# Patient Record
Sex: FEMALE | Race: WHITE
[De-identification: names, ages, dates, MRNs, and addresses within clinical notes are randomized per-mention and may not be internally consistent; named-entity substitution may affect disease eponyms.]

---

## 2017-12-09 ENCOUNTER — HOSPITAL ENCOUNTER (EMERGENCY)
Dept: HOSPITAL 62 - ER | Age: 22
Discharge: HOME | End: 2017-12-09
Payer: COMMERCIAL

## 2017-12-09 VITALS — DIASTOLIC BLOOD PRESSURE: 90 MMHG | SYSTOLIC BLOOD PRESSURE: 136 MMHG

## 2017-12-09 DIAGNOSIS — M79.631: ICD-10-CM
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
## 2017-12-09 DIAGNOSIS — S40.021A: Primary | ICD-10-CM
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
## 2017-12-09 PROCEDURE — 99281 EMR DPT VST MAYX REQ PHY/QHP: CPT

## 2017-12-09 NOTE — ER DOCUMENT REPORT
ED Extremity Problem, Upper





- General


Chief Complaint: Arm Injury


Stated Complaint: RIGHT ARM PAIN


Time Seen by Provider: 12/09/17 13:40


Mode of Arrival: Ambulatory


Information source: Patient


Notes: 





32-year-old female presents to ED for complaint of right forearm pain on 

Thursday when she was lifting packages and the bag fell over and landed on her 

forearm.  She states the back weighed between 20 and 30 pounds.  She states at 

that time she had bruising and pain but that she does not have pain at this 

time.  She has full range of motion for her arm and wrist.  Patient states that 

she went to take a note to go back to work and they said she needed to go to 1 

of the doctors for her Worker's Comp.  So she came to the emergency room.  

There is no need for an x-ray as she was x-rayed at [facility] and she does 

not have any pain at this time.


TRAVEL OUTSIDE OF THE [place] IN LAST 30 DAYS: No





- HPI


Patient complains to provider of: Right, Forearm


Onset: Other - Thursday


Recent injury: Yes


Where: Work


Quality of pain: Pressure, Sharp


Severity of pain: Gone now


Pain Level: Denies


Context: Other - Luggage fell on her right forearm at work


Associated symptoms: None


Exacerbated by: Nothing


Relieved by: Nothing


Similar symptoms previously: Yes


Recently seen / treated by doctor: Yes





- Related Data


Allergies/Adverse Reactions: 


 





No Known Allergies Allergy (Unverified 12/09/17 13:25)


 











Past Medical History





- General


Information source: Patient





- Social History


Smoking Status: Never Smoker


Cigarette use (# per day): No


Chew tobacco use (# tins/day): No


Smoking Education Provided: No


Frequency of alcohol use: Occasional


Drug Abuse: None


Occupation: [employer]


Lives with: Family


Family History: Arthritis, CAD, COPD, DM, Hyperlipidemia, Hypertension, 

Malignancy, Thyroid Disfunction


Patient has suicidal ideation: No


Patient has homicidal ideation: No





- Past Medical History


Cardiac Medical History: Reports: None


Pulmonary Medical History: Reports: None


EENT Medical History: Reports: None


Neurological Medical History: Reports: None


Endocrine Medical History: Reports: None


Renal/ Medical History: Reports: None


Malignancy Medical History: Reports: None


GI Medical History: Reports: None


Musculoskeltal Medical History: Reports None


Skin Medical History: Reports None


Psychiatric Medical History: Reports: None


Traumatic Medical History: Reports: None


Infectious Medical History: Reports: None


Past Surgical History: Reports: Hx Adenoidectomy, Hx Oral Surgery - Jaw surgery

, Hx Tonsillectomy





- Immunizations


Immunizations up to date: Yes





Review of Systems





- Review of Systems


Constitutional: No symptoms reported


EENT: No symptoms reported


Cardiovascular: No symptoms reported


Respiratory: No symptoms reported


Gastrointestinal: No symptoms reported


Genitourinary: No symptoms reported


Female Genitourinary: No symptoms reported


Musculoskeletal: No symptoms reported


Skin: No symptoms reported


Hematologic/Lymphatic: No symptoms reported


Neurological/Psychological: No symptoms reported


-: Yes All other systems reviewed and negative





Physical Exam





- Vital signs


Vitals: 





 











Temp Pulse Resp BP Pulse Ox


 


 97.5 F   83   20   136/90 H  100 


 


 12/09/17 13:30  12/09/17 13:30  12/09/17 13:30  12/09/17 13:30  12/09/17 13:30











Interpretation: Normal





- General


General appearance: Appears well, Alert





- HEENT


Head: Normocephalic, Atraumatic


Eyes: Normal


Pupils: PERRL





- Respiratory


Respiratory status: No respiratory distress


Chest status: Nontender


Breath sounds: Normal


Chest palpation: Normal





- Cardiovascular


Rhythm: Regular


Heart sounds: Normal auscultation


Murmur: No





- Abdominal


Inspection: Normal


Distension: No distension


Bowel sounds: Normal


Tenderness: Nontender


Organomegaly: No organomegaly





- Back


Back: Normal, Nontender





- Extremities


General upper extremity: Normal inspection, Nontender, Normal color, Normal ROM

, Normal temperature


General lower extremity: Normal inspection, Nontender, Normal color, Normal ROM

, Normal temperature, Normal weight bearing.  No: Van's sign


Forearm: No: Tender, Abrasion, Deformity, Ecchymosis, Instability, Laceration, 

Other





- Neurological


Neuro grossly intact: Yes


Cognition: Normal


Orientation: AAOx4


[place] Coma Scale Eye Opening: Spontaneous


[place] Coma Scale Verbal: Oriented


[place] Coma Scale Motor: Obeys Commands


[place] Coma Scale Total: 15


Speech: Normal


Motor strength normal: LUE, RUE, LLE, RLE


Sensory: Normal





- Psychological


Associated symptoms: Normal affect, Normal mood





- Skin


Skin Temperature: Warm


Skin Moisture: Dry


Skin Color: Normal





Course





- Re-evaluation


Re-evalutation: 





12/09/17 14:25


No tenderness no bruising range of motion steady unlabored.  Will discharge 

home.





- Vital Signs


Vital signs: 





 











Temp Pulse Resp BP Pulse Ox


 


 97.5 F   83   20   136/90 H  100 


 


 12/09/17 13:30  12/09/17 13:30  12/09/17 13:30  12/09/17 13:30  12/09/17 13:30














Discharge





- Discharge


Clinical Impression: 


Contusion of right upper arm


Qualifiers:


 Encounter type: initial encounter Qualified Code(s): S40.021A - Contusion of 

right upper arm, initial encounter





Condition: Stable


Disposition: HOME, SELF-CARE


Instructions:  Family Physicians / Practices


Additional Instructions: 


CONTUSION:


    Your injury has resulted in a contusion -- a crushing of the deep tissues.  

No injury to important structures was detected during the physician's exam.  

Contusions vary in the amount of pain they cause, and in the length of time 

required for healing.  Typically, the area will become bruised, and will remain 

painful to touch for two or three weeks.  However, most patients are back to 

working and playing within a few days.


     After the initial period of rest and cold-packs, your symptoms (together 

with the doctor's recommendations) will determine how rapidly you can get back 

to full activity.  Usually this means "do what feels okay, but don't do things 

that hurt."


     If re-examination was recommended, it's important to follow up as 

instructed.  Call the doctor or return any time if pain increases, if swelling 

becomes severe, if you develop numbness or weakness in an injured extremity, or 

if any other alarming symptoms occur.





USE OF TYLENOL (ACETAMINOPHEN):


     Acetaminophen may be taken for pain relief or fever control. It's much 

safer than aspirin, offering a wider range of "safe" dosages.  It is safe 

during pregnancy.  Some brand names are Tylenol, Panadol, Datril, Anacin 3, 

Tempra, and Liquiprin. Acetaminophen can be repeated every four hours.  The 

following are maximum recommended dosages:





WEIGHT         Dose             Drops                  Elixir        Chewable(

80mg)


(LBS.)                            drprs=droppers    tsp=teaspoon


6               40 mg            0.4 ml (1/2)


6-11            80 mg            0.8 ml (full)              tsp               

   1       tab


12-16         120 mg           1 1/2 drprs             3/4  tsp               1 

1/2  tabs


17-23         160 mg             2  drprs             1    tsp                 

  2       tabs


24-30         240 mg             3  drprs             1 1/2 tsp                

3       tabs


30-35         320 mg                                       2    tsp            

       4       tabs


36-41         360 mg                                       2 1/4   tsp         

     4 1/2 tabs


42-47         400 mg                                       2 1/2   tsp         

     5      tabs


48-53         480 mg                                       3    tsp            

       6      tabs


54-59         520 mg                                       3  1/4  tsp         

     6 1/2 tabs


60-64         560 mg                                       3  1/2  tsp         

     7      tabs 


65-70         600 mg                                       3  3/4  tsp         

     7 1/2 tabs


71-76         640 mg                                       4   tsp             

      8      tabs


77-82         720 mg                                       4 1/2   tsp         

    9      tabs


83-88         800 mg                                       5   tsp             

    10      tabs





>89 pounds or adults          650 mg to 900 mg





Acetaminophen can be repeated every four hours.  Maximum dose not to exceed 

4000 mg a day.





   These maximum recommended dosages are slightly higher than the dosages 

written on the product container, but these dosages are very safe and below the 

toxic dosage for acetaminophen.











ICE & ELEVATION:


     Apply ice packs frequently against the painful area.  Many different 

schedules are recommended, such as "20 minutes on, 20 minutes off" or "one hour 

ice, two hours rest."  If you need to work, you may need to go longer between 

ice treatments.  You should plan to have the area ice packed AT LEAST one-

fourth of the time.


     The ice should be applied over the wrap, tape, or splint, or over a layer 

of cloth -- not directly against the skin.  Some ice bags have a built-in cloth 

and can be put directly on the skin.


     Your injured part should be elevated as much as possible over the next 48 

hours.  Try to keep the injury above the level of the heart. Avoid use of the 

injured area.  Elevation and rest will decrease the swelling.








USE OF OVER-THE-COUNTER IBUPROFEN:


     Ibuprofen (Advil, Nuprin, Medipren, Motrin IB) is a medication for fever 

and pain control.  In addition, it has anti- inflammatory effects which may be 

beneficial, especially in the treatment of injuries.


     It's best to take ibuprofen with food.  Persons with ulcer disease or 

allergy to aspirin should notify their physician of this before taking 

ibuprofen.


     Ibuprofen can be given every four to six hours, for a total of four doses 

daily.


     Age              Pain or fever dose          Antiinflammatory dose


     6-8 yr              200 mg (1 tab)                200 mg (1 tab)


     9-11 yr             200 mg (1 tab)                200-400 mg (1-2 tab)


     11-14 yr            200-400 mg (1-2 tab)         400 mg (2 tab)


     15-adult            400 mg (2 tab)                600 mg (3 tab)





FOLLOW-UP CARE:


If you have been referred to a physician for follow-up care, call the physician

s office for an appointment as you were instructed or within the next two days.

  If you experience worsening or a significant change in your symptoms, notify 

the physician immediately or return to the Emergency Department at any time for 

re-evaluation.


Forms:  Elevated Blood Pressure, Return to Work

## 2018-06-15 ENCOUNTER — HOSPITAL ENCOUNTER (EMERGENCY)
Dept: HOSPITAL 62 - ER | Age: 23
Discharge: HOME | End: 2018-06-15
Payer: COMMERCIAL

## 2018-06-15 VITALS — DIASTOLIC BLOOD PRESSURE: 78 MMHG | SYSTOLIC BLOOD PRESSURE: 102 MMHG

## 2018-06-15 DIAGNOSIS — R32: ICD-10-CM

## 2018-06-15 DIAGNOSIS — R11.0: ICD-10-CM

## 2018-06-15 DIAGNOSIS — R10.9: ICD-10-CM

## 2018-06-15 DIAGNOSIS — N30.00: ICD-10-CM

## 2018-06-15 DIAGNOSIS — C76.2: Primary | ICD-10-CM

## 2018-06-15 DIAGNOSIS — R39.198: ICD-10-CM

## 2018-06-15 DIAGNOSIS — E86.0: ICD-10-CM

## 2018-06-15 DIAGNOSIS — R35.0: ICD-10-CM

## 2018-06-15 LAB
ADD MANUAL DIFF: NO
ALBUMIN SERPL-MCNC: 5 G/DL (ref 3.5–5)
ALP SERPL-CCNC: 48 U/L (ref 38–126)
ALT SERPL-CCNC: 31 U/L (ref 9–52)
ANION GAP SERPL CALC-SCNC: 15 MMOL/L (ref 5–19)
APPEARANCE UR: (no result)
APTT PPP: YELLOW S
AST SERPL-CCNC: 22 U/L (ref 14–36)
BASOPHILS # BLD AUTO: 0.1 10^3/UL (ref 0–0.2)
BASOPHILS NFR BLD AUTO: 0.7 % (ref 0–2)
BILIRUB DIRECT SERPL-MCNC: 0.4 MG/DL (ref 0–0.4)
BILIRUB SERPL-MCNC: 1.2 MG/DL (ref 0.2–1.3)
BILIRUB UR QL STRIP: NEGATIVE
BUN SERPL-MCNC: 19 MG/DL (ref 7–20)
CALCIUM: 10.5 MG/DL (ref 8.4–10.2)
CHLORIDE SERPL-SCNC: 101 MMOL/L (ref 98–107)
CO2 SERPL-SCNC: 29 MMOL/L (ref 22–30)
EOSINOPHIL # BLD AUTO: 0.2 10^3/UL (ref 0–0.6)
EOSINOPHIL NFR BLD AUTO: 1.9 % (ref 0–6)
ERYTHROCYTE [DISTWIDTH] IN BLOOD BY AUTOMATED COUNT: 15 % (ref 11.5–14)
GLUCOSE SERPL-MCNC: 84 MG/DL (ref 75–110)
GLUCOSE UR STRIP-MCNC: NEGATIVE MG/DL
HCT VFR BLD CALC: 43.5 % (ref 36–47)
HGB BLD-MCNC: 14.6 G/DL (ref 12–15.5)
KETONES UR STRIP-MCNC: (no result) MG/DL
LYMPHOCYTES # BLD AUTO: 3 10^3/UL (ref 0.5–4.7)
LYMPHOCYTES NFR BLD AUTO: 31.7 % (ref 13–45)
MCH RBC QN AUTO: 27.7 PG (ref 27–33.4)
MCHC RBC AUTO-ENTMCNC: 33.6 G/DL (ref 32–36)
MCV RBC AUTO: 83 FL (ref 80–97)
MONOCYTES # BLD AUTO: 0.5 10^3/UL (ref 0.1–1.4)
MONOCYTES NFR BLD AUTO: 5.1 % (ref 3–13)
NEUTROPHILS # BLD AUTO: 5.7 10^3/UL (ref 1.7–8.2)
NEUTS SEG NFR BLD AUTO: 60.6 % (ref 42–78)
NITRITE UR QL STRIP: NEGATIVE
PH UR STRIP: 5 [PH] (ref 5–9)
PLATELET # BLD: 197 10^3/UL (ref 150–450)
POTASSIUM SERPL-SCNC: 4.5 MMOL/L (ref 3.6–5)
PROT SERPL-MCNC: 8.4 G/DL (ref 6.3–8.2)
PROT UR STRIP-MCNC: NEGATIVE MG/DL
RBC # BLD AUTO: 5.28 10^6/UL (ref 3.72–5.28)
SODIUM SERPL-SCNC: 144.8 MMOL/L (ref 137–145)
SP GR UR STRIP: 1.03
TOTAL CELLS COUNTED % (AUTO): 100 %
UROBILINOGEN UR-MCNC: 2 MG/DL (ref ?–2)
WBC # BLD AUTO: 9.5 10^3/UL (ref 4–10.5)

## 2018-06-15 PROCEDURE — 99283 EMERGENCY DEPT VISIT LOW MDM: CPT

## 2018-06-15 PROCEDURE — 87086 URINE CULTURE/COLONY COUNT: CPT

## 2018-06-15 PROCEDURE — 80053 COMPREHEN METABOLIC PANEL: CPT

## 2018-06-15 PROCEDURE — 81001 URINALYSIS AUTO W/SCOPE: CPT

## 2018-06-15 PROCEDURE — 96372 THER/PROPH/DIAG INJ SC/IM: CPT

## 2018-06-15 PROCEDURE — 36415 COLL VENOUS BLD VENIPUNCTURE: CPT

## 2018-06-15 PROCEDURE — 85025 COMPLETE CBC W/AUTO DIFF WBC: CPT

## 2018-06-15 NOTE — ER DOCUMENT REPORT
ED GI/





- General


Chief Complaint: Urinary Problem


Stated Complaint: ABDOMINAL PAIN


Time Seen by Provider: 06/15/18 13:40


Mode of Arrival: Ambulatory


Information source: Patient


TRAVEL OUTSIDE OF THE [place] IN LAST 30 DAYS: No





- HPI


Patient complains to provider of: Abdominal pain, Other - URINARY INCONTINENCE


Onset: This morning


Timing/Duration: Sudden


Quality of pain: Burning, Cramping, Sharp


Severity at maximum: Moderate


Severity in ED: Moderate


Context: Other - KNOWN INTRA-ABDOMINAL MALIGNANCY, ON CHEMOTx.  denies: Bad food

, Lifting, Out of the country travel, Pregnant, Recent trauma


Vaginal bleeding (Compared to normal period): None


Menstrual period history: Abnormal - AMENORRHEIC W/ CHEMOTx.  denies: Pregnant


Associated symptoms: Nausea, Urinary frequency.  denies: Chills, Diarrhea, Fever

, Hematuria


Exacerbated by: Denies


Relieved by: Denies


Similar symptoms previously: No


Recently seen / treated by doctor: Yes - ROUTINE F/U W/ ONCOLOGY





- Related Data


Allergies/Adverse Reactions: 


 





No Known Allergies Allergy (Verified 06/15/18 13:11)


 











Past Medical History





- General


Information source: Patient





- Social History


Smoking Status: Never Smoker


Cigarette use (# per day): No


Chew tobacco use (# tins/day): No


Frequency of alcohol use: None


Drug Abuse: None


Lives with: Spouse/Significant other


Family History: Arthritis, CAD, COPD, DM, Hyperlipidemia, Hypertension, 

Malignancy, Thyroid Disfunction


Patient has suicidal ideation: No


Patient has homicidal ideation: No





- Past Medical History


Cardiac Medical History: Reports: None


Pulmonary Medical History: Reports: None


EENT Medical History: Reports: None


Neurological Medical History: Reports: None


Endocrine Medical History: Reports: None


Renal/ Medical History: Reports: None.  Denies: Hx Peritoneal Dialysis


Malignancy Medical History: Reports: Other - SEE HPI


GI Medical History: Reports: None


Musculoskeltal Medical History: Reports None


Psychiatric Medical History: Reports: None


Past Surgical History: Reports: Hx Adenoidectomy, Hx Oral Surgery - Jaw surgery

, Hx Tonsillectomy





- Immunizations


Immunizations up to date: Yes





Review of Systems





- Review of Systems


Constitutional: No symptoms reported


EENT: No symptoms reported


Cardiovascular: No symptoms reported


Respiratory: No symptoms reported


Gastrointestinal: See HPI


Genitourinary: See HPI


Female Genitourinary: See HPI


Musculoskeletal: No symptoms reported


Skin: No symptoms reported


Neurological/Psychological: No symptoms reported





Physical Exam





- Vital signs


Vitals: 


 











Temp Pulse Resp BP Pulse Ox


 


 98.7 F   128 H  20   121/85   100 


 


 06/15/18 13:19  06/15/18 13:19  06/15/18 13:19  06/15/18 13:19  06/15/18 13:19











Interpretation: Tachycardic - SAYS SHE'S MILDLY TACHYCARDIC @ BASELINE.  No: 

Hypotensive, Tachypneic, Febrile





- General


General appearance: Appears well, Alert


In distress: None





- HEENT


Head: Normocephalic


Eyes: Normal.  No: Pale conjunctiva


Conjunctiva: Normal


Ears: Normal


Nasal: Normal


Mouth/Lips: Normal


Mucous membranes: Dry - MILDLY


Pharynx: Normal


Neck: Normal





- Respiratory


Respiratory status: No respiratory distress


Breath sounds: Normal





- Cardiovascular


Rhythm: Regular


Heart sounds: Normal auscultation


Murmur: No





- Abdominal


Inspection: Normal


Distension: No distension


Bowel sounds: Hypoactive


Tenderness: Tender - MILD, SUPRAPUBIC





- Back


Back: Normal.  No: CVA tenderness





- Extremities


General upper extremity: Normal inspection


General lower extremity: Normal inspection





- Neurological


Neuro grossly intact: Yes


Cognition: Normal


Orientation: AAOx4





- Psychological


Associated symptoms: Normal affect, Normal mood





- Skin


Skin Temperature: Warm


Skin Moisture: Dry


Skin Color: Normal


Skin Turgor: Elastic





Course





- Vital Signs


Vital signs: 


 











Temp Pulse Resp BP Pulse Ox


 


 98.7 F   128 H  21 H  113/79   100 


 


 06/15/18 13:19  06/15/18 13:19  06/15/18 16:01  06/15/18 16:00  06/15/18 16:01














- Laboratory


Result Diagrams: 


 06/15/18 15:20





 06/15/18 15:20


Laboratory results interpreted by me: 


 











  06/15/18 06/15/18 06/15/18





  15:20 15:20 15:20


 


RDW  15.0 H  


 


Calcium   10.5 H 


 


Total Protein   8.4 H 


 


Urine Ketones    TRACE H


 


Urine Urobilinogen    2.0 H














- Consults


  ** DR. HUIZAR


Time consulted: 17:43


Reason for consultation: 





06/15/18 17:53


Discussed patient's presentation, physical findings, and laboratory results 

with Dr. Huizar, who accessed patient's oncology records.  She advised that 

imaging studies are not indicated at the present time, as patient had a 

negative PET scan 6 weeks ago.  Also, she feels it is reasonable to treat the 

urinary tract infection with oral antibiotics and urinary analgesics.  She or 

someone from her office will follow up with patient by phone on Monday.  Plan 

discussed with patient and spouse, and they are pleased.


Consulted provider: follow-up in office





Discharge





- Discharge


Clinical Impression: 


 Dehydration, Intra-abdominal malignant neoplasm





Urinary tract infection


Qualifiers:


 Urinary tract infection type: acute cystitis Hematuria presence: without 

hematuria Qualified Code(s): N30.00 - Acute cystitis without hematuria





Condition: Stable


Disposition: HOME, SELF-CARE


Instructions:  Ciprofloxacin ([facility]), Rocephin ([facility]), Urinary Anesthetic Agent (

[facility]), Urinary Tract Infection ([facility])


Additional Instructions: 


REST, DRINK PLENTY OF FLUIDS.


TAKE CIPRO AND PYRIDIUM AS DIRECTED, BEGIN THIS EVENING.


CONTINUE ALL OF YOUR USUAL MEDS.


FOLLOW UP WITH YOUR ONCOLOGIST AS SCHEDULED (SOMEONE FROM THEIR OFFICE WILL 

CALL MONDAY TO CHECK ON YOU).


RETURN TO E.R. IF PROBLEMS, ANY TIME.


Prescriptions: 


Ciprofloxacin HCl [Cipro 500 mg Tablet] 500 mg PO BID #20 tablet


Phenazopyridine HCl [Pyridium 200 mg Tablet] 200 mg PO TID #15 tablet

## 2019-08-29 ENCOUNTER — HOSPITAL ENCOUNTER (EMERGENCY)
Dept: HOSPITAL 62 - ER | Age: 24
LOS: 1 days | Discharge: HOME | End: 2019-08-30
Payer: COMMERCIAL

## 2019-08-29 DIAGNOSIS — R50.9: ICD-10-CM

## 2019-08-29 DIAGNOSIS — N30.00: Primary | ICD-10-CM

## 2019-08-29 DIAGNOSIS — R10.31: ICD-10-CM

## 2019-08-29 DIAGNOSIS — Z79.899: ICD-10-CM

## 2019-08-29 PROCEDURE — 83690 ASSAY OF LIPASE: CPT

## 2019-08-29 PROCEDURE — 80053 COMPREHEN METABOLIC PANEL: CPT

## 2019-08-29 PROCEDURE — 96361 HYDRATE IV INFUSION ADD-ON: CPT

## 2019-08-29 PROCEDURE — 85025 COMPLETE CBC W/AUTO DIFF WBC: CPT

## 2019-08-29 PROCEDURE — 81001 URINALYSIS AUTO W/SCOPE: CPT

## 2019-08-29 PROCEDURE — 87088 URINE BACTERIA CULTURE: CPT

## 2019-08-29 PROCEDURE — 36591 DRAW BLOOD OFF VENOUS DEVICE: CPT

## 2019-08-29 PROCEDURE — 96365 THER/PROPH/DIAG IV INF INIT: CPT

## 2019-08-29 PROCEDURE — 87040 BLOOD CULTURE FOR BACTERIA: CPT

## 2019-08-29 PROCEDURE — 96375 TX/PRO/DX INJ NEW DRUG ADDON: CPT

## 2019-08-29 PROCEDURE — 74177 CT ABD & PELVIS W/CONTRAST: CPT

## 2019-08-29 PROCEDURE — 87086 URINE CULTURE/COLONY COUNT: CPT

## 2019-08-29 PROCEDURE — 99284 EMERGENCY DEPT VISIT MOD MDM: CPT

## 2019-08-29 PROCEDURE — 36415 COLL VENOUS BLD VENIPUNCTURE: CPT

## 2019-08-29 PROCEDURE — 81025 URINE PREGNANCY TEST: CPT

## 2019-08-30 VITALS — SYSTOLIC BLOOD PRESSURE: 106 MMHG | DIASTOLIC BLOOD PRESSURE: 55 MMHG

## 2019-08-30 LAB
ADD MANUAL DIFF: NO
ALBUMIN SERPL-MCNC: 4.3 G/DL (ref 3.5–5)
ALP SERPL-CCNC: 75 U/L (ref 38–126)
ANION GAP SERPL CALC-SCNC: 12 MMOL/L (ref 5–19)
APPEARANCE UR: CLEAR
APTT PPP: (no result) S
AST SERPL-CCNC: 38 U/L (ref 14–36)
BASOPHILS # BLD AUTO: 0.1 10^3/UL (ref 0–0.2)
BASOPHILS NFR BLD AUTO: 0.6 % (ref 0–2)
BILIRUB DIRECT SERPL-MCNC: 0.4 MG/DL (ref 0–0.4)
BILIRUB SERPL-MCNC: 1.3 MG/DL (ref 0.2–1.3)
BILIRUB UR QL STRIP: NEGATIVE
BUN SERPL-MCNC: 9 MG/DL (ref 7–20)
CALCIUM: 9.1 MG/DL (ref 8.4–10.2)
CHLORIDE SERPL-SCNC: 94 MMOL/L (ref 98–107)
CO2 SERPL-SCNC: 26 MMOL/L (ref 22–30)
EOSINOPHIL # BLD AUTO: 0.1 10^3/UL (ref 0–0.6)
EOSINOPHIL NFR BLD AUTO: 1.2 % (ref 0–6)
ERYTHROCYTE [DISTWIDTH] IN BLOOD BY AUTOMATED COUNT: 13.3 % (ref 11.5–14)
GLUCOSE SERPL-MCNC: 65 MG/DL (ref 75–110)
GLUCOSE UR STRIP-MCNC: NEGATIVE MG/DL
HCT VFR BLD CALC: 34.5 % (ref 36–47)
HGB BLD-MCNC: 11.4 G/DL (ref 12–15.5)
KETONES UR STRIP-MCNC: 80 MG/DL
LYMPHOCYTES # BLD AUTO: 3.2 10^3/UL (ref 0.5–4.7)
LYMPHOCYTES NFR BLD AUTO: 28.2 % (ref 13–45)
MCH RBC QN AUTO: 26.8 PG (ref 27–33.4)
MCHC RBC AUTO-ENTMCNC: 33.1 G/DL (ref 32–36)
MCV RBC AUTO: 81 FL (ref 80–97)
MONOCYTES # BLD AUTO: 0.8 10^3/UL (ref 0.1–1.4)
MONOCYTES NFR BLD AUTO: 7.1 % (ref 3–13)
NEUTROPHILS # BLD AUTO: 7.1 10^3/UL (ref 1.7–8.2)
NEUTS SEG NFR BLD AUTO: 62.9 % (ref 42–78)
NITRITE UR QL STRIP: NEGATIVE
PH UR STRIP: 6 [PH] (ref 5–9)
PLATELET # BLD: 161 10^3/UL (ref 150–450)
POTASSIUM SERPL-SCNC: 3.8 MMOL/L (ref 3.6–5)
PROT SERPL-MCNC: 7 G/DL (ref 6.3–8.2)
PROT UR STRIP-MCNC: NEGATIVE MG/DL
RBC # BLD AUTO: 4.26 10^6/UL (ref 3.72–5.28)
SP GR UR STRIP: 1
TOTAL CELLS COUNTED % (AUTO): 100 %
UROBILINOGEN UR-MCNC: NEGATIVE MG/DL (ref ?–2)
WBC # BLD AUTO: 11.2 10^3/UL (ref 4–10.5)

## 2019-08-30 NOTE — ER DOCUMENT REPORT
ED General





- General


Chief Complaint: Abdominal Pain


Stated Complaint: ABDOMINAL PAIN


Time Seen by Provider: 08/30/19 00:22


Primary Care Provider: 


PAYTON NAILS DO [Primary Care Provider] - Follow up as needed


Notes: 





Patient is a 24-year-old female presents to the emergency department for right 

lower abdominal pain.  States this pain started on Sunday.  Patient states on 

Tuesday she presented to the [facility] emergency departments.  States she was told 

"everything is fine."  Patient states she did undergo an ultrasound at that 

time.  Patient states she feels as though she may have also gotten a CT image at

that time.  Patient states they told her to return to the emergency department 

should she develop a fever or have continued pain.  Patient states today she had

a subjective fever which is why she represents to the emergency room.  Patient's

denying any nausea, vomiting, diarrhea she is denying any vaginal discharge.  

Patient is admitting to generalized dysuria.


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 

her abdomen.  Patient states she had this tumor removed approximately 6 weeks 

ago via her oncologist Dr. Lopez.


Patient does not reach out to her oncologist about this generalized abdominal 

pain since it started on Sunday.








TRAVEL OUTSIDE OF THE [place] IN LAST 30 DAYS: No





- Related Data


Allergies/Adverse Reactions: 


                                        





No Known Allergies Allergy (Verified 06/15/18 13:11)


   











Past Medical History





- General


Information source: Patient





- Social History


Smoking Status: Never Smoker


Chew tobacco use (# tins/day): No


Frequency of alcohol use: None


Drug Abuse: None


Family History: Arthritis, CAD, COPD, DM, Hyperlipidemia, Hypertension, 

Malignancy, Thyroid Disfunction


Patient has suicidal ideation: No


Patient has homicidal ideation: No


Renal/ Medical History: Denies: Hx Peritoneal Dialysis


Past Surgical History: Reports: Hx Adenoidectomy, Hx Oral Surgery - Jaw surgery,

Hx Tonsillectomy





- Immunizations


Immunizations up to date: Yes





Review of Systems





- Review of Systems


Constitutional: Fever


EENT: No symptoms reported


Cardiovascular: No symptoms reported


Respiratory: No symptoms reported


Gastrointestinal: See HPI


Genitourinary: See HPI


Female Genitourinary: See HPI


Musculoskeletal: No symptoms reported


Skin: No symptoms reported


Hematologic/Lymphatic: No symptoms reported


Neurological/Psychological: No symptoms reported





Physical Exam





- Vital signs


Vitals: 





                                        











Temp Pulse Resp BP Pulse Ox


 


 99.7 F   109 H  18   122/82   95 


 


 08/29/19 21:54  08/29/19 21:54  08/29/19 21:54  08/29/19 21:54  08/29/19 21:54














- Notes


Notes: 


GENERAL: Alert, interacts well. No acute distress.


HEAD: Normocephalic, atraumatic.


EYES: Pupils equal, round, and reactive to light. Extraocular movements intact.


ENT: Oral mucosa moist, tongue midline. 


NECK: Full range of motion. Supple. Trachea midline.


LUNGS: Clear to auscultation bilaterally, no wheezes, rales, or rhonchi. No 

respiratory distress.


HEART: Regular rate and rhythm. No murmur


ABDOMEN: Soft, Non-distended. Bowel sounds present in all 4 quadrants.  

Generalized right lower quadrant abdominal pain, no right pelvic pain noted.  No

Perez sign noted, no epigastric abdominal pain, no left upper or left lower 

abdominal pain noted.


EXTREMITIES: Moves all 4 extremities spontaneously. No edema, normal radial and 

dorsalis pedis pulses bilaterally. No cyanosis.


BACK: no cervical, thoracic, lumbar midline tenderness. No saddle anesthesia, 

normal distal neurovascular exam.  No CVA tenderness noted bilaterally


NEUROLOGICAL: Alert and oriented x3. Normal speech. cranial nerves II through 

XII grossly intact. 


PSYCH: Normal affect, normal mood.


SKIN: Warm, dry, normal turgor. No rashes or lesions noted.








Course





- Re-evaluation


Re-evalutation: 





08/30/19 03:04





Laboratory











  08/30/19 08/30/19 08/30/19





  00:13 00:13 00:13


 


WBC  11.2 H  


 


RBC  4.26  


 


Hgb  11.4 L  


 


Hct  34.5 L  


 


MCV  81  


 


MCH  26.8 L  


 


MCHC  33.1  


 


RDW  13.3  


 


Plt Count  161  


 


Lymph % (Auto)  28.2  


 


Mono % (Auto)  7.1  


 


Eos % (Auto)  1.2  


 


Baso % (Auto)  0.6  


 


Absolute Neuts (auto)  7.1  


 


Absolute Lymphs (auto)  3.2  


 


Absolute Monos (auto)  0.8  


 


Absolute Eos (auto)  0.1  


 


Absolute Basos (auto)  0.1  


 


Seg Neutrophils %  62.9  


 


Sodium   132.1 L 


 


Potassium   3.8 


 


Chloride   94 L 


 


Carbon Dioxide   26 


 


Anion Gap   12 


 


BUN   9 


 


Creatinine   0.64 


 


Est GFR ( Amer)   > 60 


 


Est GFR (MDRD) Non-Af   > 60 


 


Glucose   65 L 


 


Calcium   9.1 


 


Total Bilirubin   1.3 


 


Direct Bilirubin   0.4 


 


Neonat Total Bilirubin   Not Reportable 


 


Neonat Direct Bilirubin   Not Reportable 


 


Neonat Indirect Bili   Not Reportable 


 


AST   38 H 


 


ALT   24 


 


Alkaline Phosphatase   75 


 


Total Protein   7.0 


 


Albumin   4.3 


 


Lipase   37.9 


 


Urine Color    STRAW


 


Urine Appearance    CLEAR


 


Urine pH    6.0


 


Ur Specific Gravity    1.005


 


Urine Protein    NEGATIVE


 


Urine Glucose (UA)    NEGATIVE


 


Urine Ketones    80 H


 


Urine Blood    NEGATIVE


 


Urine Nitrite    NEGATIVE


 


Urine Bilirubin    NEGATIVE


 


Urine Urobilinogen    NEGATIVE


 


Ur Leukocyte Esterase    TRACE H


 


Urine WBC (Auto)    8


 


Urine RBC (Auto)    0


 


Urine Bacteria (Auto)    TRACE


 


Squamous Epi Cells Auto    1


 


Urine Mucus (Auto)    RARE


 


Urine Ascorbic Acid    NEGATIVE


 


Urine HCG, Qual    NEGATIVE











                                        





Abdomen/Pelvis CT  08/30/19 01:21


IMPRESSION:


 


Mild stranding with soft tissue density posterior to the pancreas


and duodenum with adjacent reactive borderline lymph nodes. This


may be due to acute pancreatitis or duodenitis. Recommend


correlation with amylase and lipase.


Normal appendix.


 


TECHNICAL DOCUMENTATION:


 


Quality ID # 436: Final reports with documentation of one or more


dose reduction techniques (e.g., Automated exposure control,


adjustment of the mA and/or kV according to patient size, use of


iterative reconstruction technique)


 


copyright 2011 [employer]- All Rights Reserved


 








I discussed this case with my attending Dr. Vasquez.  He is suggesting D5 normal 

saline bolus as well as starting IV antibiotics for urinary tract infection.  

Recommend sending patient home on Keflex for continued care of urinary tract 

infection with close follow-up with patient's oncologist.





On reexamination of the patient she states her pain has somewhat subsided.  

Reexamination of her abdomen continues with no pain right upper quadrant, 

epigastric, left upper quadrant.  Minor tenderness noted right lower quadrant at

this time.





At this time will discharge with return precautions and follow-up 

recommendations.  Verbal discharge instructions given a the bedside and 

opportunity for questions given. Medication warnings reviewed. Patient is in 

agreement with this plan and has verbalized understanding of return precautions 

and the need for primary care follow-up in the next 24-72 hours.





This medical record was dictated with voice recognizing software.  There may be 

grammatical, syntax errors that are unintended.





- Vital Signs


Vital signs: 





                                        











Temp Pulse Resp BP Pulse Ox


 


 99.7 F   109 H  18   122/82   95 


 


 08/29/19 21:54  08/29/19 21:54  08/29/19 21:54  08/29/19 21:54  08/29/19 21:54














- Laboratory


Result Diagrams: 


                                 08/30/19 00:13





                                 08/30/19 00:13


Laboratory results interpreted by me: 





                                        











  08/30/19 08/30/19 08/30/19





  00:13 00:13 00:13


 


WBC  11.2 H  


 


Hgb  11.4 L  


 


Hct  34.5 L  


 


MCH  26.8 L  


 


Sodium   132.1 L 


 


Chloride   94 L 


 


Glucose   65 L 


 


AST   38 H 


 


Urine Ketones    80 H


 


Ur Leukocyte Esterase    TRACE H














Discharge





- Discharge


Clinical Impression: 


 Right lower quadrant abdominal pain





Urinary tract infection


Qualifiers:


 Urinary tract infection type: acute cystitis Hematuria presence: without 

hematuria Qualified Code(s): N30.00 - Acute cystitis without hematuria





Condition: Stable


Disposition: HOME, SELF-CARE


Instructions:  Abdominal Pain ([facility]), Cephalexin ([facility]), Urinary Tract Infection 

([facility])


Additional Instructions: 


  as we discussed you have been seen and treated in the emergency department for

your abdominal pain.  You have been diagnosed with a urinary tract infection.  

Please take antibiotics as prescribed.  Please also make sure you call your 

oncologist in the morning.  Please follow-up with a primary care provider as 

well in the next 24 to 48 hours.  Return to the emergency room for any further 

concerns.


Prescriptions: 


Cephalexin Monohydrate [Keflex 500 mg Capsule] 500 mg PO BID 7 Days #14 capsule


Referrals: 


PAYTON NAILS DO [Primary Care Provider] - Follow up as needed

## 2019-08-30 NOTE — RADIOLOGY REPORT (SQ)
EXAM DESCRIPTION: 



CT ABDOMEN PELVIS WITH IV CONTRAST



COMPLETED DATE/TME:  08/30/2019 01:21



CLINICAL HISTORY: 



24 years, Female, RLQ pain. HCG NEG



COMPARISON:

None.



TECHNIQUE:

Axial CT images of the abdomen and pelvis were obtained after the

administration of IV contrast. Sagittal and coronal reformats

were performed.   Images stored on PACS.

 

All CT scanners at this facility use dose modulation, iterative

reconstruction, and/or weight based dosing when appropriate to

reduce radiation dose to as low as reasonably achievable (ALARA).





[facility]: Dose Right [facility]: CareDose   [facility]: Dose Right    [facility]:

Teradose 4D    [facility]: [employer] Technologies



LIMITATIONS:

None.



FINDINGS:



The lung bases are clear. The liver, gallbladder, spleen, and

adrenal glands are unremarkable. There is stranding and soft

tissue density posterior to the pancreas and adjacent duodenum

with nearby retroperitoneal lymph nodes measuring up to 9 mm in

size.

Both kidneys appear normal. No evidence of hydronephrosis or

hydroureter.

The abdominal aorta is normal in caliber. There is no free air.

There is a mild amount of free fluid within the pelvis.

The stomach appears unremarkable. There is no evidence of a bowel

obstruction. The appendix is normal. The colon contains a

moderate amount of stool. The uterus, adnexa, and urinary bladder

are unremarkable.

There are no lytic or blastic bone lesions





IMPRESSION:



Mild stranding with soft tissue density posterior to the pancreas

and duodenum with adjacent reactive borderline lymph nodes. This

may be due to acute pancreatitis or duodenitis. Recommend

correlation with amylase and lipase.

Normal appendix.

 

TECHNICAL DOCUMENTATION:



Quality ID # 436: Final reports with documentation of one or more

dose reduction techniques (e.g., Automated exposure control,

adjustment of the mA and/or kV according to patient size, use of

iterative reconstruction technique)



copyright 2011 [employer]- All Rights Reserved